# Patient Record
Sex: MALE | Race: WHITE | NOT HISPANIC OR LATINO | URBAN - METROPOLITAN AREA
[De-identification: names, ages, dates, MRNs, and addresses within clinical notes are randomized per-mention and may not be internally consistent; named-entity substitution may affect disease eponyms.]

---

## 2018-11-02 ENCOUNTER — HOSPITAL ENCOUNTER (EMERGENCY)
Facility: OTHER | Age: 43
Discharge: HOME | End: 2018-11-02
Attending: EMERGENCY MEDICINE
Payer: OTHER GOVERNMENT

## 2018-11-02 VITALS
DIASTOLIC BLOOD PRESSURE: 68 MMHG | BODY MASS INDEX: 22.73 KG/M2 | TEMPERATURE: 98 F | OXYGEN SATURATION: 99 % | RESPIRATION RATE: 18 BRPM | WEIGHT: 150 LBS | SYSTOLIC BLOOD PRESSURE: 119 MMHG | HEART RATE: 82 BPM | HEIGHT: 68 IN

## 2018-11-02 DIAGNOSIS — N17.9 AKI (ACUTE KIDNEY INJURY): Primary | ICD-10-CM

## 2018-11-02 DIAGNOSIS — R11.2 NON-INTRACTABLE VOMITING WITH NAUSEA, UNSPECIFIED VOMITING TYPE: ICD-10-CM

## 2018-11-02 LAB
ALBUMIN SERPL BCP-MCNC: 4.4 G/DL
ALP SERPL-CCNC: 114 U/L
ALT SERPL W/O P-5'-P-CCNC: 19 U/L
ANION GAP SERPL CALC-SCNC: 16 MMOL/L
AST SERPL-CCNC: 19 U/L
BASOPHILS # BLD AUTO: 0.02 K/UL
BASOPHILS NFR BLD: 0.2 %
BILIRUB SERPL-MCNC: 0.7 MG/DL
BILIRUB UR QL STRIP: NEGATIVE
BUN SERPL-MCNC: 28 MG/DL
CALCIUM SERPL-MCNC: 10.6 MG/DL
CHLORIDE SERPL-SCNC: 98 MMOL/L
CK SERPL-CCNC: 87 U/L
CLARITY UR: CLEAR
CO2 SERPL-SCNC: 17 MMOL/L
COLOR UR: YELLOW
CREAT SERPL-MCNC: 3 MG/DL
DIFFERENTIAL METHOD: ABNORMAL
EOSINOPHIL # BLD AUTO: 0 K/UL
EOSINOPHIL NFR BLD: 0.1 %
ERYTHROCYTE [DISTWIDTH] IN BLOOD BY AUTOMATED COUNT: 12.6 %
EST. GFR  (AFRICAN AMERICAN): 28 ML/MIN/1.73 M^2
EST. GFR  (NON AFRICAN AMERICAN): 24 ML/MIN/1.73 M^2
GLUCOSE SERPL-MCNC: 134 MG/DL
GLUCOSE UR QL STRIP: NEGATIVE
HCT VFR BLD AUTO: 52.4 %
HGB BLD-MCNC: 18 G/DL
HGB UR QL STRIP: NEGATIVE
KETONES UR QL STRIP: NEGATIVE
LEUKOCYTE ESTERASE UR QL STRIP: NEGATIVE
LYMPHOCYTES # BLD AUTO: 1.3 K/UL
LYMPHOCYTES NFR BLD: 16.1 %
MCH RBC QN AUTO: 29.9 PG
MCHC RBC AUTO-ENTMCNC: 34.4 G/DL
MCV RBC AUTO: 87 FL
MONOCYTES # BLD AUTO: 0.9 K/UL
MONOCYTES NFR BLD: 11.2 %
NEUTROPHILS # BLD AUTO: 5.8 K/UL
NEUTROPHILS NFR BLD: 71.7 %
NITRITE UR QL STRIP: NEGATIVE
PH UR STRIP: 6 [PH] (ref 5–8)
PLATELET # BLD AUTO: 346 K/UL
PMV BLD AUTO: 9.4 FL
POTASSIUM SERPL-SCNC: 4.7 MMOL/L
PROT SERPL-MCNC: 10.2 G/DL
PROT UR QL STRIP: NEGATIVE
RBC # BLD AUTO: 6.03 M/UL
SODIUM SERPL-SCNC: 131 MMOL/L
SP GR UR STRIP: 1.02 (ref 1–1.03)
URN SPEC COLLECT METH UR: NORMAL
UROBILINOGEN UR STRIP-ACNC: NEGATIVE EU/DL
WBC # BLD AUTO: 8.09 K/UL

## 2018-11-02 PROCEDURE — 82550 ASSAY OF CK (CPK): CPT

## 2018-11-02 PROCEDURE — 80053 COMPREHEN METABOLIC PANEL: CPT

## 2018-11-02 PROCEDURE — 99284 EMERGENCY DEPT VISIT MOD MDM: CPT | Mod: 25

## 2018-11-02 PROCEDURE — 85025 COMPLETE CBC W/AUTO DIFF WBC: CPT

## 2018-11-02 PROCEDURE — 96361 HYDRATE IV INFUSION ADD-ON: CPT

## 2018-11-02 PROCEDURE — 81003 URINALYSIS AUTO W/O SCOPE: CPT

## 2018-11-02 PROCEDURE — 96360 HYDRATION IV INFUSION INIT: CPT

## 2018-11-02 PROCEDURE — 25000003 PHARM REV CODE 250: Performed by: PHYSICIAN ASSISTANT

## 2018-11-02 RX ADMIN — SODIUM CHLORIDE 1000 ML: 0.9 INJECTION, SOLUTION INTRAVENOUS at 03:11

## 2018-11-02 NOTE — ED NOTES
"ROUNDING:  Lying on the stretcher with HOB elevated. AAOx4. States generalize body aches 2/10. Pt states "I'm not having those nathaniel horses anymore. I'm feeling much better." Denies any other discomfort at this time. Skin is warm and dry. Resp:18 even and unlabored. Comfort and BR needs addressed. Plan of care updated. NADN. Bed locked in low position, side rails up x2 and call light within reach. Will continue to monitor.  "

## 2018-11-02 NOTE — ED TRIAGE NOTES
Pt reports body aches and feeling dehydrated. Reports he has a j loop and has chronic diarrhea and gets body aches when he is dehydrated. Pt reports diarrhea is under control today. Denies any abd pain at current moment. Denies any n/v.

## 2018-11-02 NOTE — ED NOTES
ROUNDING:  Lying on the stretcher with HOB elevated. AAOx4. Calm and cooperative. C/o generalize body aches 6/10. States pain is tolerable. Denies n/v, dizziness, lightheadedness or any other discomfort at this time. Skin is warm and dry. Resp:18 even and unlabored. Comfort and BR needs addressed. Plan of care updated. NADN. Bed locked in low position, side rails up x2 and call light within reach. Warm blanket provided. Family member at BS.Will continue to monitor.

## 2018-11-02 NOTE — ED PROVIDER NOTES
Encounter Date: 11/2/2018       History     Chief Complaint   Patient presents with    Emesis     pt with vomiting and diarrhea  x 2 days with joint pain and cold sweats.      Patient is a 43 y.o. Male with a past medical history of ulcerative colitis status post J-pouch surgery presenting to the emergency department with concern for dehydration.  The patient states that yesterday morning he woke up with significant nausea and vomiting.  He reports he had at least 10 episodes of nonbloody emesis.  He states that today he has not had any vomiting, has been able to tolerate crackers and some likely.  He reports he is concerned he is dehydrated as happened in the past after his surgery.  He denies any significant abdominal pain. No fever chills.  No diarrhea.  He denies any rashes, states he has not eaten any unusual foods recently.  He reports his surgeries occurred approximately 1 year ago in [place] but he now lives in [place], due to return home later this evening.This is the extent of the patient's complaints at this time.         The history is provided by the patient.     Review of patient's allergies indicates:  No Known Allergies  No past medical history on file.  No past surgical history on file.  No family history on file.  Social History     Tobacco Use    Smoking status: Not on file   Substance Use Topics    Alcohol use: Not on file    Drug use: Not on file     Review of Systems   Constitutional: Negative for activity change, chills, fatigue and fever.   HENT: Negative for congestion, rhinorrhea and sore throat.    Eyes: Negative for photophobia and visual disturbance.   Respiratory: Negative for cough and shortness of breath.    Cardiovascular: Negative for chest pain.   Gastrointestinal: Positive for abdominal pain, nausea and vomiting. Negative for diarrhea.   Genitourinary: Negative for dysuria, hematuria and urgency.   Musculoskeletal: Negative for back pain, myalgias and neck pain.   Skin:  Negative for color change and wound.   Neurological: Negative for weakness and headaches.   Psychiatric/Behavioral: Negative for agitation and confusion.       Physical Exam     Initial Vitals [11/02/18 1401]   BP Pulse Resp Temp SpO2   (!) 119/55 110 18 97.6 °F (36.4 °C) 99 %      MAP       --         Physical Exam    Nursing note and vitals reviewed.  Constitutional: Vital signs are normal. He appears well-developed and well-nourished. He is not diaphoretic.  Non-toxic appearance. He does not have a sickly appearance. He does not appear ill. No distress.   Uncomfortable appearing  male accompanied by his wife in the emergency department.  Speaking clearly in full sentences.  No acute distress.   HENT:   Head: Normocephalic and atraumatic.   Right Ear: External ear normal.   Left Ear: External ear normal.   Nose: Nose normal.   Mouth/Throat: Oropharynx is clear and moist.   Eyes: Conjunctivae and EOM are normal.   Neck: Normal range of motion. Neck supple.   Cardiovascular: Regular rhythm and normal heart sounds. Tachycardia present.    Pulmonary/Chest: Breath sounds normal. No respiratory distress. He has no wheezes.   Abdominal: Soft. Bowel sounds are normal. He exhibits no distension. There is no tenderness. There is no rebound and no guarding.   Musculoskeletal: Normal range of motion.   Neurological: He is alert and oriented to person, place, and time.   Skin: Skin is warm.   Psychiatric: He has a normal mood and affect. His behavior is normal. Judgment and thought content normal.         ED Course   Procedures  Labs Reviewed   CBC W/ AUTO DIFFERENTIAL - Abnormal; Notable for the following components:       Result Value    Lymph% 16.1 (*)     All other components within normal limits   COMPREHENSIVE METABOLIC PANEL - Abnormal; Notable for the following components:    Sodium 131 (*)     CO2 17 (*)     Glucose 134 (*)     BUN, Bld 28 (*)     Creatinine 3.0 (*)     Calcium 10.6 (*)     Total Protein 10.2  (*)     eGFR if  28 (*)     eGFR if non  24 (*)     All other components within normal limits   CK   CK   URINALYSIS, REFLEX TO URINE CULTURE          Medical Decision Making:   Initial Assessment:   Urgent evaluation of a 43 y.o. male presenting to the emergency department complaining of nausea, vomiting, concern for dehydration. Patient is afebrile, nontoxic appearing and hemodynamically stable. Physical exam reveals tachycardia, no significant tenderness to palpation abdomen.  No rebound or guarding.  Will plan for labs, fluids and reassess.  Clinical Tests:   Lab Tests: Ordered and Reviewed  ED Management:  CBC is unremarkable.  CMP shows hyponatremia, bicarb is 17.  BUN and creatinine are elevated at 28 and 3.0, no previous baseline.  Calcium is 10.6, total protein is 10.2.  Patient was given a 2nd L of fluids.  Explained to the patient that I would like to keep him in the hospital due to his a KI for further hydration and evaluation.  The patient states that he is on  leave and is due back at the [place] in [place] this evening.  He states he cannot stay because of this.  Patient is reliable, states there is a medical facility on face.  He assures me that he will go there immediately for further evaluation management of his current condition. Patient signed an AMA form at 5:12 p.m., and was given a copy of his labs.  I expressed in the absolute importance of report immediately to the medical facility for further evaluation management.  I feel comfortable with discharging the patient home with this plan.The patient was instructed to follow up with a primary care provider in 2 days or to return to the emergency department for worsening symptoms. The treatment plan was discussed with the patient who demonstrated understanding and comfort with plan. The patient's history, physical exam, and plan of care was discussed with and agreed upon with my supervising physician.     Other:    I have discussed this case with another health care provider.       <> Summary of the Discussion: Dr. Vasques  This note was created using M Modal Fluency Direct. There may be typographical errors secondary to dictation.                         Clinical Impression:     1. NADIA (acute kidney injury)    2. Non-intractable vomiting with nausea, unspecified vomiting type           Disposition:   Disposition: Discharged  Condition: Stable                        Nahomi Sanchez PA-C  11/02/18 0533

## 2018-11-02 NOTE — ED NOTES
URINE COLLECTION PENDING: Pt states he does not need to void at this time. Sterile specimen container and urine clean catch instructions given to patient. Pt instructed to notify nurse immediately once urine specimen has been obtained. Pt verbalized understanding. Call light within reach. Will continue to monitor.

## 2018-11-02 NOTE — ED NOTES
"AMA:  Pt states she's unable to stay for further treatment "I have a plane I need to get on at 6:50 today. I'm on leave and I need to get [place]."  Nahomi Sanchez PA-C at  reviewing AMA consent with patient. AMA form signed and witness as per hospital policy. Pt states he understands risk of leaving AMA. See AMA form filed in pt's chart.   "

## 2018-11-02 NOTE — ED NOTES
"Urine collection pending:  Pt states he does not have the urge to void at this time. Pt states "I have this j-pouch and I don't go that much to the bathroom. It's because of my pouch." Nahomi Sanchez PA-C notified. No new orders received. Will continue to monitor.   "

## 2023-01-25 ENCOUNTER — OFFICE VISIT (OUTPATIENT)
Dept: PODIATRY | Facility: CLINIC | Age: 48
End: 2023-01-25
Payer: OTHER GOVERNMENT

## 2023-01-25 VITALS
BODY MASS INDEX: 22.73 KG/M2 | DIASTOLIC BLOOD PRESSURE: 81 MMHG | HEIGHT: 68 IN | HEART RATE: 77 BPM | SYSTOLIC BLOOD PRESSURE: 129 MMHG | WEIGHT: 150 LBS

## 2023-01-25 DIAGNOSIS — M12.871 TRANSIENT ARTHROPATHY INVOLVING RIGHT ANKLE AND FOOT: ICD-10-CM

## 2023-01-25 DIAGNOSIS — M70.72 BURSITIS OF LEFT HIP, UNSPECIFIED BURSA: ICD-10-CM

## 2023-01-25 DIAGNOSIS — M21.761 UNEQUAL LIMB LENGTH (ACQUIRED), RIGHT TIBIA: Primary | ICD-10-CM

## 2023-01-25 PROBLEM — M54.50 LOW BACK PAIN: Status: ACTIVE | Noted: 2023-01-25

## 2023-01-25 PROBLEM — Z02.89 HEALTH EXAMINATION OF DEFINED SUBPOPULATION: Status: ACTIVE | Noted: 2023-01-25

## 2023-01-25 PROBLEM — S42.023A CLOSED FRACTURE OF SHAFT OF CLAVICLE: Status: ACTIVE | Noted: 2018-12-03

## 2023-01-25 PROBLEM — M72.2 PLANTAR FASCIITIS: Status: ACTIVE | Noted: 2023-01-25

## 2023-01-25 PROBLEM — F41.1 GENERALIZED ANXIETY DISORDER: Status: ACTIVE | Noted: 2023-01-25

## 2023-01-25 PROBLEM — H00.12 CHALAZION RIGHT LOWER EYELID: Status: ACTIVE | Noted: 2023-01-25

## 2023-01-25 PROBLEM — K51.90 ULCERATIVE COLITIS: Status: ACTIVE | Noted: 2023-01-25

## 2023-01-25 PROBLEM — F43.12 POST-TRAUMATIC STRESS DISORDER, CHRONIC: Status: ACTIVE | Noted: 2023-01-25

## 2023-01-25 PROBLEM — M25.552 PAIN IN LEFT HIP: Status: ACTIVE | Noted: 2023-01-25

## 2023-01-25 PROBLEM — H90.5 SENSORINEURAL HEARING LOSS: Status: ACTIVE | Noted: 2023-01-25

## 2023-01-25 PROCEDURE — 99203 OFFICE O/P NEW LOW 30 MIN: CPT | Mod: S$PBB,,, | Performed by: PODIATRIST

## 2023-01-25 PROCEDURE — 99203 PR OFFICE/OUTPT VISIT, NEW, LEVL III, 30-44 MIN: ICD-10-PCS | Mod: S$PBB,,, | Performed by: PODIATRIST

## 2023-01-25 PROCEDURE — 99203 OFFICE O/P NEW LOW 30 MIN: CPT | Mod: PBBFAC,PN | Performed by: PODIATRIST

## 2023-01-25 PROCEDURE — 99999 PR PBB SHADOW E&M-NEW PATIENT-LVL III: CPT | Mod: PBBFAC,,, | Performed by: PODIATRIST

## 2023-01-25 PROCEDURE — 99999 PR PBB SHADOW E&M-NEW PATIENT-LVL III: ICD-10-PCS | Mod: PBBFAC,,, | Performed by: PODIATRIST
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
## 2023-02-02 ENCOUNTER — TELEPHONE (OUTPATIENT)
Dept: PODIATRY | Facility: CLINIC | Age: 48
End: 2023-02-02
Payer: OTHER GOVERNMENT

## 2023-02-02 NOTE — TELEPHONE ENCOUNTER
----- Message from Rui Cotto sent at 2/2/2023  2:53 PM CST -----  Regarding: Pt Call Back  Pt called stating that he would like check status prescription       Contact Zay [phone]

## 2023-02-03 ENCOUNTER — TELEPHONE (OUTPATIENT)
Dept: PODIATRY | Facility: CLINIC | Age: 48
End: 2023-02-03
Payer: OTHER GOVERNMENT

## 2023-02-03 NOTE — TELEPHONE ENCOUNTER
----- Message from Abraham Frias sent at 2/3/2023 10:36 AM CST -----  Regarding: miss call  Pt returning Fahad miss call    Call

## 2023-02-03 NOTE — TELEPHONE ENCOUNTER
Spoke with patient. Patient inquiring about the status of  prescribed orthotics that were discussed at office visit